# Patient Record
Sex: MALE | Race: OTHER | HISPANIC OR LATINO | URBAN - METROPOLITAN AREA
[De-identification: names, ages, dates, MRNs, and addresses within clinical notes are randomized per-mention and may not be internally consistent; named-entity substitution may affect disease eponyms.]

---

## 2024-08-04 ENCOUNTER — EMERGENCY (EMERGENCY)
Facility: HOSPITAL | Age: 39
LOS: 1 days | Discharge: HOME | End: 2024-08-04
Attending: EMERGENCY MEDICINE
Payer: COMMERCIAL

## 2024-08-04 VITALS
HEIGHT: 67 IN | HEART RATE: 74 BPM | WEIGHT: 124.34 LBS | SYSTOLIC BLOOD PRESSURE: 123 MMHG | DIASTOLIC BLOOD PRESSURE: 79 MMHG | TEMPERATURE: 99 F | RESPIRATION RATE: 18 BRPM | OXYGEN SATURATION: 98 %

## 2024-08-04 PROCEDURE — 73030 X-RAY EXAM OF SHOULDER: CPT

## 2024-08-04 PROCEDURE — 99284 EMERGENCY DEPT VISIT MOD MDM: CPT

## 2024-08-04 PROCEDURE — T1013: CPT

## 2024-08-04 PROCEDURE — 99283 EMERGENCY DEPT VISIT LOW MDM: CPT

## 2024-08-04 PROCEDURE — 73030 X-RAY EXAM OF SHOULDER: CPT | Mod: 26,LT

## 2024-08-04 RX ORDER — IBUPROFEN 200 MG
600 TABLET ORAL ONCE
Refills: 0 | Status: COMPLETED | OUTPATIENT
Start: 2024-08-04 | End: 2024-08-04

## 2024-08-04 RX ORDER — METHOCARBAMOL 500 MG
1500 TABLET ORAL ONCE
Refills: 0 | Status: COMPLETED | OUTPATIENT
Start: 2024-08-04 | End: 2024-08-04

## 2024-08-04 RX ORDER — IBUPROFEN 200 MG
1 TABLET ORAL
Qty: 20 | Refills: 0
Start: 2024-08-04

## 2024-08-04 RX ORDER — METHOCARBAMOL 500 MG
1 TABLET ORAL
Qty: 15 | Refills: 0
Start: 2024-08-04

## 2024-08-04 RX ADMIN — Medication 600 MILLIGRAM(S): at 19:43

## 2024-08-04 RX ADMIN — Medication 1500 MILLIGRAM(S): at 19:43

## 2024-08-04 NOTE — ED PROVIDER NOTE - CARE PROVIDER_API CALL
David Chilel  Orthopaedic Surgery  [address] [place] [address]  Phone: [phone]  Fax: [phone]  Follow Up Time:

## 2024-08-04 NOTE — ED PROVIDER NOTE - PATIENT PORTAL LINK FT
You can access the FollowMyHealth Patient Portal offered by [facility] by registering at the following website: [email or web address]. By joining [employer]’s FollowMyHealth portal, you will also be able to view your health information using other applications (apps) compatible with our system.
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
## 2024-08-04 NOTE — ED PROVIDER NOTE - PHYSICAL EXAMINATION
Gen: No acute distress, non toxic  HEENT: Mucous membranes moist, pink conjunctivae, PERRL, EOMI  CV: RRR, nl s1/s2.  Resp: CTAB, normal rate and effort  GI: Abdomen soft, NT, ND. No rebound, no guarding  : No CVAT  Neuro: A&O x 3, CNII-XII grossly intact, moving all 4 extremities  MSK: No midline spine tenderness to palpation. +Tenderness left paraspinal cervical region and left shoulder, ROM shoulder mildly limited abduction above shoulder height. 5/5 strength BUE/BLE, sensation intact throughout, 2+ distal pulses  Skin: No rashes. intact and perfused.
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
## 2025-05-05 ENCOUNTER — EMERGENCY (EMERGENCY)
Facility: HOSPITAL | Age: 40
LOS: 1 days | End: 2025-05-05
Attending: EMERGENCY MEDICINE
Payer: MEDICAID

## 2025-05-05 VITALS
OXYGEN SATURATION: 98 % | TEMPERATURE: 98 F | WEIGHT: 129.85 LBS | SYSTOLIC BLOOD PRESSURE: 112 MMHG | RESPIRATION RATE: 20 BRPM | DIASTOLIC BLOOD PRESSURE: 73 MMHG | HEIGHT: 62.99 IN | HEART RATE: 87 BPM

## 2025-05-05 VITALS
TEMPERATURE: 97 F | OXYGEN SATURATION: 99 % | DIASTOLIC BLOOD PRESSURE: 82 MMHG | HEART RATE: 81 BPM | SYSTOLIC BLOOD PRESSURE: 115 MMHG | RESPIRATION RATE: 19 BRPM

## 2025-05-05 LAB
ALBUMIN SERPL ELPH-MCNC: 4 G/DL — SIGNIFICANT CHANGE UP (ref 3.3–5.2)
ALP SERPL-CCNC: 93 U/L — SIGNIFICANT CHANGE UP (ref 40–120)
ALT FLD-CCNC: 18 U/L — SIGNIFICANT CHANGE UP
ANION GAP SERPL CALC-SCNC: 11 MMOL/L — SIGNIFICANT CHANGE UP (ref 5–17)
APPEARANCE UR: CLEAR — SIGNIFICANT CHANGE UP
AST SERPL-CCNC: 23 U/L — SIGNIFICANT CHANGE UP
BACTERIA # UR AUTO: NEGATIVE /HPF — SIGNIFICANT CHANGE UP
BASOPHILS # BLD AUTO: 0.03 K/UL — SIGNIFICANT CHANGE UP (ref 0–0.2)
BASOPHILS NFR BLD AUTO: 0.4 % — SIGNIFICANT CHANGE UP (ref 0–2)
BILIRUB SERPL-MCNC: 0.7 MG/DL — SIGNIFICANT CHANGE UP (ref 0.4–2)
BILIRUB UR-MCNC: NEGATIVE — SIGNIFICANT CHANGE UP
BUN SERPL-MCNC: 24.1 MG/DL — HIGH (ref 8–20)
CALCIUM SERPL-MCNC: 8.9 MG/DL — SIGNIFICANT CHANGE UP (ref 8.4–10.5)
CAST: 1 /LPF — SIGNIFICANT CHANGE UP (ref 0–4)
CHLORIDE SERPL-SCNC: 101 MMOL/L — SIGNIFICANT CHANGE UP (ref 96–108)
CO2 SERPL-SCNC: 24 MMOL/L — SIGNIFICANT CHANGE UP (ref 22–29)
COLOR SPEC: YELLOW — SIGNIFICANT CHANGE UP
CREAT SERPL-MCNC: 1.08 MG/DL — SIGNIFICANT CHANGE UP (ref 0.5–1.3)
DIFF PNL FLD: NEGATIVE — SIGNIFICANT CHANGE UP
EGFR: 88 ML/MIN/1.73M2 — SIGNIFICANT CHANGE UP
EGFR: 88 ML/MIN/1.73M2 — SIGNIFICANT CHANGE UP
EOSINOPHIL # BLD AUTO: 0.12 K/UL — SIGNIFICANT CHANGE UP (ref 0–0.5)
EOSINOPHIL NFR BLD AUTO: 1.6 % — SIGNIFICANT CHANGE UP (ref 0–6)
GAS PNL BLDV: SIGNIFICANT CHANGE UP
GLUCOSE SERPL-MCNC: 86 MG/DL — SIGNIFICANT CHANGE UP (ref 70–99)
GLUCOSE UR QL: NEGATIVE MG/DL — SIGNIFICANT CHANGE UP
HCT VFR BLD CALC: 41.3 % — SIGNIFICANT CHANGE UP (ref 39–50)
HGB BLD-MCNC: 14.4 G/DL — SIGNIFICANT CHANGE UP (ref 13–17)
IMM GRANULOCYTES # BLD AUTO: 0.02 K/UL — SIGNIFICANT CHANGE UP (ref 0–0.07)
IMM GRANULOCYTES NFR BLD AUTO: 0.3 % — SIGNIFICANT CHANGE UP (ref 0–0.9)
KETONES UR-MCNC: 15 MG/DL
LACTATE SERPL-SCNC: 0.9 MMOL/L — SIGNIFICANT CHANGE UP (ref 0.5–2)
LEUKOCYTE ESTERASE UR-ACNC: NEGATIVE — SIGNIFICANT CHANGE UP
LYMPHOCYTES # BLD AUTO: 1.55 K/UL — SIGNIFICANT CHANGE UP (ref 1–3.3)
LYMPHOCYTES NFR BLD AUTO: 21.1 % — SIGNIFICANT CHANGE UP (ref 13–44)
MCHC RBC-ENTMCNC: 32.1 PG — SIGNIFICANT CHANGE UP (ref 27–34)
MCHC RBC-ENTMCNC: 34.9 G/DL — SIGNIFICANT CHANGE UP (ref 32–36)
MCV RBC AUTO: 92.2 FL — SIGNIFICANT CHANGE UP (ref 80–100)
MONOCYTES # BLD AUTO: 0.56 K/UL — SIGNIFICANT CHANGE UP (ref 0–0.9)
MONOCYTES NFR BLD AUTO: 7.6 % — SIGNIFICANT CHANGE UP (ref 2–14)
NEUTROPHILS # BLD AUTO: 5.06 K/UL — SIGNIFICANT CHANGE UP (ref 1.8–7.4)
NEUTROPHILS NFR BLD AUTO: 69 % — SIGNIFICANT CHANGE UP (ref 43–77)
NITRITE UR-MCNC: NEGATIVE — SIGNIFICANT CHANGE UP
NRBC # BLD AUTO: 0 K/UL — SIGNIFICANT CHANGE UP (ref 0–0)
NRBC # FLD: 0 K/UL — SIGNIFICANT CHANGE UP (ref 0–0)
NRBC BLD AUTO-RTO: 0 /100 WBCS — SIGNIFICANT CHANGE UP (ref 0–0)
PH UR: 6.5 — SIGNIFICANT CHANGE UP (ref 5–8)
PLATELET # BLD AUTO: 244 K/UL — SIGNIFICANT CHANGE UP (ref 150–400)
PMV BLD: 9.7 FL — SIGNIFICANT CHANGE UP (ref 7–13)
POTASSIUM SERPL-MCNC: 4.8 MMOL/L — SIGNIFICANT CHANGE UP (ref 3.5–5.3)
POTASSIUM SERPL-SCNC: 4.8 MMOL/L — SIGNIFICANT CHANGE UP (ref 3.5–5.3)
PROT SERPL-MCNC: 7.6 G/DL — SIGNIFICANT CHANGE UP (ref 6.6–8.7)
PROT UR-MCNC: 30 MG/DL
RBC # BLD: 4.48 M/UL — SIGNIFICANT CHANGE UP (ref 4.2–5.8)
RBC # FLD: 14.6 % — HIGH (ref 10.3–14.5)
RBC CASTS # UR COMP ASSIST: 2 /HPF — SIGNIFICANT CHANGE UP (ref 0–4)
SODIUM SERPL-SCNC: 136 MMOL/L — SIGNIFICANT CHANGE UP (ref 135–145)
SP GR SPEC: 1.02 — SIGNIFICANT CHANGE UP (ref 1–1.03)
SQUAMOUS # UR AUTO: 0 /HPF — SIGNIFICANT CHANGE UP (ref 0–5)
UROBILINOGEN FLD QL: 1 MG/DL — SIGNIFICANT CHANGE UP (ref 0.2–1)
WBC # BLD: 7.34 K/UL — SIGNIFICANT CHANGE UP (ref 3.8–10.5)
WBC # FLD AUTO: 7.34 K/UL — SIGNIFICANT CHANGE UP (ref 3.8–10.5)
WBC UR QL: 4 /HPF — SIGNIFICANT CHANGE UP (ref 0–5)

## 2025-05-05 PROCEDURE — 36415 COLL VENOUS BLD VENIPUNCTURE: CPT

## 2025-05-05 PROCEDURE — 82947 ASSAY GLUCOSE BLOOD QUANT: CPT

## 2025-05-05 PROCEDURE — 85018 HEMOGLOBIN: CPT

## 2025-05-05 PROCEDURE — 83605 ASSAY OF LACTIC ACID: CPT

## 2025-05-05 PROCEDURE — 76870 US EXAM SCROTUM: CPT | Mod: 26

## 2025-05-05 PROCEDURE — 84132 ASSAY OF SERUM POTASSIUM: CPT

## 2025-05-05 PROCEDURE — 85025 COMPLETE CBC W/AUTO DIFF WBC: CPT

## 2025-05-05 PROCEDURE — 87086 URINE CULTURE/COLONY COUNT: CPT

## 2025-05-05 PROCEDURE — 96374 THER/PROPH/DIAG INJ IV PUSH: CPT | Mod: XU

## 2025-05-05 PROCEDURE — 82803 BLOOD GASES ANY COMBINATION: CPT

## 2025-05-05 PROCEDURE — 99285 EMERGENCY DEPT VISIT HI MDM: CPT

## 2025-05-05 PROCEDURE — 74177 CT ABD & PELVIS W/CONTRAST: CPT | Mod: MC

## 2025-05-05 PROCEDURE — 96375 TX/PRO/DX INJ NEW DRUG ADDON: CPT | Mod: XU

## 2025-05-05 PROCEDURE — T1013: CPT

## 2025-05-05 PROCEDURE — 84295 ASSAY OF SERUM SODIUM: CPT

## 2025-05-05 PROCEDURE — 76870 US EXAM SCROTUM: CPT

## 2025-05-05 PROCEDURE — 87491 CHLMYD TRACH DNA AMP PROBE: CPT

## 2025-05-05 PROCEDURE — 74177 CT ABD & PELVIS W/CONTRAST: CPT | Mod: 26

## 2025-05-05 PROCEDURE — 82435 ASSAY OF BLOOD CHLORIDE: CPT

## 2025-05-05 PROCEDURE — 80053 COMPREHEN METABOLIC PANEL: CPT

## 2025-05-05 PROCEDURE — 81001 URINALYSIS AUTO W/SCOPE: CPT

## 2025-05-05 PROCEDURE — 99284 EMERGENCY DEPT VISIT MOD MDM: CPT | Mod: 25

## 2025-05-05 PROCEDURE — 85014 HEMATOCRIT: CPT

## 2025-05-05 PROCEDURE — 82330 ASSAY OF CALCIUM: CPT

## 2025-05-05 PROCEDURE — 87591 N.GONORRHOEAE DNA AMP PROB: CPT

## 2025-05-05 RX ORDER — DOXYCYCLINE HYCLATE 100 MG
1 TABLET ORAL
Qty: 20 | Refills: 0
Start: 2025-05-05 | End: 2025-05-14

## 2025-05-05 RX ORDER — CEFTRIAXONE 500 MG/1
1000 INJECTION, POWDER, FOR SOLUTION INTRAMUSCULAR; INTRAVENOUS ONCE
Refills: 0 | Status: COMPLETED | OUTPATIENT
Start: 2025-05-05 | End: 2025-05-05

## 2025-05-05 RX ORDER — ACETAMINOPHEN 500 MG/5ML
1000 LIQUID (ML) ORAL ONCE
Refills: 0 | Status: COMPLETED | OUTPATIENT
Start: 2025-05-05 | End: 2025-05-05

## 2025-05-05 RX ORDER — CEFTRIAXONE 500 MG/1
1000 INJECTION, POWDER, FOR SOLUTION INTRAMUSCULAR; INTRAVENOUS ONCE
Refills: 0 | Status: DISCONTINUED | OUTPATIENT
Start: 2025-05-05 | End: 2025-05-05

## 2025-05-05 RX ORDER — SODIUM CHLORIDE 9 G/1000ML
1000 INJECTION, SOLUTION INTRAVENOUS ONCE
Refills: 0 | Status: COMPLETED | OUTPATIENT
Start: 2025-05-05 | End: 2025-05-05

## 2025-05-05 RX ORDER — DOXYCYCLINE HYCLATE 100 MG
100 TABLET ORAL EVERY 12 HOURS
Refills: 0 | Status: DISCONTINUED | OUTPATIENT
Start: 2025-05-05 | End: 2025-05-12

## 2025-05-05 RX ADMIN — SODIUM CHLORIDE 1000 MILLILITER(S): 9 INJECTION, SOLUTION INTRAVENOUS at 13:05

## 2025-05-05 RX ADMIN — Medication 400 MILLIGRAM(S): at 13:05

## 2025-05-05 RX ADMIN — Medication 100 MILLIGRAM(S): at 15:13

## 2025-05-05 RX ADMIN — CEFTRIAXONE 1000 MILLIGRAM(S): 500 INJECTION, POWDER, FOR SOLUTION INTRAMUSCULAR; INTRAVENOUS at 15:13

## 2025-05-05 NOTE — ED ADULT NURSE REASSESSMENT NOTE - NS ED NURSE REASSESS COMMENT FT1
pt arrives to ED c/o right testicle pain/swelling- pt states the same , denies urinary symptoms. Pt seen and eval by provider, medicated as per MD orders- awaiting US

## 2025-05-05 NOTE — ED PROVIDER NOTE - ATTENDING APP SHARED VISIT CONTRIBUTION OF CARE
40 y/o male with no pmhx presents tot he ED for right testicle pain since Wednesday. Right testicle pain then radiated into the Right groin and to the right flank. No h prior kidney stones. He went to  who did a Urine- +WBC/blood and sent to ED. No anal intercourse   No cough/congestion, no n/v, no fever, No diarrhea, no burning with urination, no penile discharge, no trauma, no hematuria. +Sexually active, no known STD/STD.     I, Trace Matthew, performed the initial face to face bedside interview with this patient regarding history of present illness, review of symptoms and relevant past medical, social and family history.  I completed an independent physical examination.  I was the initial provider who evaluated this patient. I have signed out the follow up of any pending tests (i.e. labs, radiological studies) to the ACP.  I have communicated the patient’s plan of care and disposition with the ACP.

## 2025-05-05 NOTE — ED PROVIDER NOTE - NSFOLLOWUPINSTRUCTIONS_ED_ALL_ED_FT
SEEK IMMEDIATE MEDICAL CARE IF YOU HAVE ANY OF THE FOLLOWING SYMPTOMS: severe back or abdominal pain, fever, inability to keep fluids or medicine down, dizziness/lightheadedness, or a change in mental status.    Please follow up with urology within 3 days   Please take medications as directed    BUSQUE ATENCIÓN MÉDICA INMEDIATA SI PRESENTA ALGUNO DE LOS SIGUIENTES SÍNTOMAS: dolor de espalda o abdominal intenso, fiebre, incapacidad para retener líquidos o medicamentos, mareos o aturdimiento, o cambios en el estado mental.    Consulte con urología en un plazo de 3 días.  Ravena gisselle medicamentos según las indicaciones.

## 2025-05-05 NOTE — ED PROVIDER NOTE - PATIENT PORTAL LINK FT
You can access the FollowMyHealth Patient Portal offered by [facility] by registering at the following website: [email or web address]. By joining [employer]’s FollowMyHealth portal, you will also be able to view your health information using other applications (apps) compatible with our system.

## 2025-05-05 NOTE — ED PROVIDER NOTE - CLINICAL SUMMARY MEDICAL DECISION MAKING FREE TEXT BOX
40 y/o male with no pmhx presents tot he ED for right testicle pain since Wednesday. Right testicle pain then radiated into the Right groin and to the right flank. No h prior kidney stones. He went to  who did a Urine- +WBC/blood and sent to ED.   No cough/congestion, no n/v, no fever, No diarrhea, no burning with urination, no penile discharge, no trauma, no hematuria. +Sexually active, no known STD/STD.    Trang at bedside.  -TTP to right lower quad. right groin and right CVAT  -Upon exam, well appearing male with Ttp to right testicle, uncircumcised penis with retractable foreskin, no discharge, no surrounding erythema/fluctuance. Cremasteric reflex intact bilat, pain radiating into the right groin and flank 38 y/o male with no pmhx presents tot he ED for right testicle pain since Wednesday. Right testicle pain then radiated into the Right groin and to the right flank. No h prior kidney stones. He went to  who did a Urine- +WBC/blood and sent to ED.   No cough/congestion, no n/v, no fever, No diarrhea, no burning with urination, no penile discharge, no trauma, no hematuria. +Sexually active, no known STD/STD.    Trang at bedside.  -TTP to right lower quad. right groin and right CVAT  -Upon exam, well appearing male with Ttp to right testicle, uncircumcised penis with retractable foreskin, no discharge, no surrounding erythema/fluctuance. Cremasteric reflex intact bilat, pain radiating into the right groin and flank  -Labs revealing no leukocytosis, stable H+H, CMP grossly wnl  -US revealing right epididymitis. Left epididymis: Cyst epididymal body is 17 x 6.9 mm  CT revealing No acute process within the abdomen or pelvis.  -UA positive at , grossly negative in ED  Results given with , Pt re-assessed at this time, feeling well, noting improvement in symptoms. VSS, tolerating PO intake, ambulating in ED with steady gait. All results reviewed with pt, all questions answered. Pt provided copy of all results. Return precautions given. Stable for dc. 38 y/o male with no pmhx presents tot he ED for right testicle pain since Wednesday. Right testicle pain then radiated into the Right groin and to the right flank. No h prior kidney stones. He went to  who did a Urine- +WBC/blood and sent to ED. No anal intercourse   No cough/congestion, no n/v, no fever, No diarrhea, no burning with urination, no penile discharge, no trauma, no hematuria. +Sexually active, no known STD/STD.    Trang at bedside.  -TTP to right lower quad. right groin and right CVAT  -Upon exam, well appearing male with Ttp to right testicle, uncircumcised penis with retractable foreskin, no discharge, no surrounding erythema/fluctuance. Cremasteric reflex intact bilat, pain radiating into the right groin and flank  -Labs revealing no leukocytosis, stable H+H, CMP grossly wnl  -US revealing right epididymitis. Left epididymis: Cyst epididymal body is 17 x 6.9 mm  CT revealing No acute process within the abdomen or pelvis.  -UA positive at UC, grossly negative in ED  Results given with , Pt re-assessed at this time, feeling well, noting improvement in symptoms. VSS, tolerating PO intake, ambulating in ED with steady gait. All results reviewed with pt, all questions answered. Pt provided copy of all results. Return precautions given. Stable for dc.

## 2025-05-05 NOTE — ED PROVIDER NOTE - PHYSICAL EXAMINATION
Gen: No acute distress, non toxic male, speaking in full sentences   HEENT: NCAT, Mucous membranes moist   Eyes: pink conjunctivae, EOMI, PERRL  CV: RRR, nl s1/s2 noted   Resp: CTAB, normal rate and effort  GI: (+) RLQ ttp radiating into the Right groin, no budges. Abdomen soft, ND. No rebound, no guarding  : (+) Ttp to right testicle, uncircumcised penis with retractable foreskin, no discharge, no surrounding erythema/fluctuance. Cremasteric reflex intact bilat {chaperoned by PA-TAMANNA Hay}  : (+) Right CVAT  Neuro: A&O x 3, sensorimotor intact without deficits   MSK: No spine or joint tenderness to palpation, Full ROM ext x 4  Skin: No rashes. intact and perfused  Ambulatory

## 2025-05-05 NOTE — ED PROVIDER NOTE - OBJECTIVE STATEMENT
38 y/o male with no pmhx presents tot he ED for right testicle pain since Wednesday. Right testicle pain then radiated into the Right groin and to the right flank. No h prior kidney stones. He went to  who did a Urine- +WBC/blood and sent to ED.   No cough/congestion, no n/v, no fever, No diarrhea, no burning with urination, no penile discharge, no trauma, no hematuria. +Sexually active, no known STD/STD.    Trang at bedside.

## 2025-05-06 LAB
C TRACH RRNA SPEC QL NAA+PROBE: DETECTED
CULTURE RESULTS: SIGNIFICANT CHANGE UP
N GONORRHOEA RRNA SPEC QL NAA+PROBE: SIGNIFICANT CHANGE UP
SPECIMEN SOURCE: SIGNIFICANT CHANGE UP
SPECIMEN SOURCE: SIGNIFICANT CHANGE UP